# Patient Record
Sex: FEMALE | Race: WHITE | URBAN - METROPOLITAN AREA
[De-identification: names, ages, dates, MRNs, and addresses within clinical notes are randomized per-mention and may not be internally consistent; named-entity substitution may affect disease eponyms.]

---

## 2023-05-25 ENCOUNTER — HOSPITAL ENCOUNTER (OUTPATIENT)
Age: 43
Setting detail: SPECIMEN
Discharge: HOME | End: 2023-05-25

## 2023-05-25 ENCOUNTER — OFFICE VISIT (OUTPATIENT)
Dept: OBGYN CLINIC | Age: 43
End: 2023-05-25
Payer: COMMERCIAL

## 2023-05-25 VITALS
HEIGHT: 64 IN | WEIGHT: 190.8 LBS | BODY MASS INDEX: 32.58 KG/M2 | DIASTOLIC BLOOD PRESSURE: 80 MMHG | SYSTOLIC BLOOD PRESSURE: 114 MMHG

## 2023-05-25 DIAGNOSIS — Z20.2 POSSIBLE EXPOSURE TO STD: ICD-10-CM

## 2023-05-25 DIAGNOSIS — N89.8 VAGINAL ITCHING: Primary | ICD-10-CM

## 2023-05-25 DIAGNOSIS — N89.8 VAGINAL ITCHING: ICD-10-CM

## 2023-05-25 LAB
CANDIDA SPECIES, DNA PROBE: POSITIVE
GARDNERELLA VAGINALIS, DNA PROBE: NEGATIVE
SOURCE: ABNORMAL
TRICHOMONAS VAGINALIS DNA: NEGATIVE

## 2023-05-25 PROCEDURE — 99203 OFFICE O/P NEW LOW 30 MIN: CPT | Performed by: NURSE PRACTITIONER

## 2023-05-25 RX ORDER — HYDROXYZINE HYDROCHLORIDE 10 MG/1
10 TABLET, FILM COATED ORAL 3 TIMES DAILY PRN
COMMUNITY

## 2023-05-25 RX ORDER — TRAZODONE HYDROCHLORIDE 50 MG/1
50 TABLET ORAL NIGHTLY PRN
COMMUNITY
Start: 2023-03-03

## 2023-05-25 RX ORDER — METRONIDAZOLE 7.5 MG/G
1 GEL VAGINAL DAILY
Qty: 1 EACH | Refills: 0 | Status: SHIPPED | OUTPATIENT
Start: 2023-05-25 | End: 2023-05-30

## 2023-05-25 RX ORDER — FLUCONAZOLE 150 MG/1
150 TABLET ORAL ONCE
Qty: 1 TABLET | Refills: 0 | Status: SHIPPED | OUTPATIENT
Start: 2023-05-25 | End: 2023-05-25

## 2023-05-25 ASSESSMENT — ENCOUNTER SYMPTOMS
GASTROINTESTINAL NEGATIVE: 1
RESPIRATORY NEGATIVE: 1

## 2023-05-25 NOTE — PROGRESS NOTES
PROBLEM VISIT     Date of service: 2023    Joshua Velazquez  Is a 41 y.o. female    PT's PCP is: No primary care provider on file. : 1980                                             Subjective:       Patient's last menstrual period was 2023. OB History    Para Term  AB Living   4 4 4     4   SAB IAB Ectopic Molar Multiple Live Births             4      # Outcome Date GA Lbr Jose Rafael/2nd Weight Sex Delivery Anes PTL Lv   4 Term      Vag-Spont   LIZET   3 Term      Vag-Spont   LIZET   2 Term      Vag-Spont   LIZET   1 Term      Vag-Spont   LIZET        Social History     Tobacco Use   Smoking Status Never   Smokeless Tobacco Never        Social History     Substance and Sexual Activity   Alcohol Use Yes    Comment: occ       Allergies: Penicillins      Current Outpatient Medications:     hydrOXYzine HCl (ATARAX) 10 MG tablet, Take 1 tablet by mouth 3 times daily as needed for Itching, Disp: , Rfl:     metroNIDAZOLE (METROGEL) 0.75 % vaginal gel, Place 1 Applicatorful vaginally daily for 5 days, Disp: 1 each, Rfl: 0    fluconazole (DIFLUCAN) 150 MG tablet, Take 1 tablet by mouth once for 1 dose, Disp: 1 tablet, Rfl: 0    traZODone (DESYREL) 50 MG tablet, Take 1 tablet by mouth nightly as needed, Disp: , Rfl:     venlafaxine (EFFEXOR XR) 150 MG extended release capsule, , Disp: , Rfl:     Social History     Substance and Sexual Activity   Sexual Activity Yes    Partners: Male         Chief Complaint   Patient presents with    Vaginal Itching     C/O vaginal itching and swelling. Denies discharge. Going through divorce and has had new partners. PE:  Vital Signs  Blood pressure 114/80, height 5' 4\" (1.626 m), weight 190 lb 12.8 oz (86.5 kg), last menstrual period 2023. HPI: Patient present today with complaints of vaginal itching and swelling for the past 3-4 days. Denies any discharge. Admits to change in sexual partner would like std screening.      PT denies fever,

## 2023-05-26 LAB
C TRACH DNA SPEC QL PROBE+SIG AMP: NEGATIVE
N GONORRHOEA DNA SPEC QL PROBE+SIG AMP: NEGATIVE
SPECIMEN DESCRIPTION: NORMAL

## 2023-09-14 ENCOUNTER — HOSPITAL ENCOUNTER (OUTPATIENT)
Age: 43
Setting detail: SPECIMEN
Discharge: HOME | End: 2023-09-14

## 2023-09-14 ENCOUNTER — OFFICE VISIT (OUTPATIENT)
Dept: OBGYN CLINIC | Age: 43
End: 2023-09-14
Payer: COMMERCIAL

## 2023-09-14 VITALS
BODY MASS INDEX: 30.05 KG/M2 | HEIGHT: 64 IN | DIASTOLIC BLOOD PRESSURE: 80 MMHG | WEIGHT: 176 LBS | SYSTOLIC BLOOD PRESSURE: 115 MMHG

## 2023-09-14 DIAGNOSIS — D50.9 IRON DEFICIENCY ANEMIA, UNSPECIFIED IRON DEFICIENCY ANEMIA TYPE: ICD-10-CM

## 2023-09-14 DIAGNOSIS — Z01.419 WOMEN'S ANNUAL ROUTINE GYNECOLOGICAL EXAMINATION: Primary | ICD-10-CM

## 2023-09-14 DIAGNOSIS — R23.3 EASY BRUISING: ICD-10-CM

## 2023-09-14 DIAGNOSIS — N92.0 MENORRHAGIA WITH REGULAR CYCLE: ICD-10-CM

## 2023-09-14 DIAGNOSIS — R79.89 LOW SERUM VITAMIN D: ICD-10-CM

## 2023-09-14 LAB
BASOPHILS # BLD: <0.03 K/UL (ref 0–0.2)
BASOPHILS NFR BLD: 0 % (ref 0–2)
EOSINOPHIL # BLD: 0.08 K/UL (ref 0–0.44)
EOSINOPHILS RELATIVE PERCENT: 1 % (ref 1–4)
ERYTHROCYTE [DISTWIDTH] IN BLOOD BY AUTOMATED COUNT: 14 % (ref 11.8–14.4)
HCT VFR BLD AUTO: 36 % (ref 36.3–47.1)
HGB BLD-MCNC: 11.4 G/DL (ref 11.9–15.1)
IMM GRANULOCYTES # BLD AUTO: <0.03 K/UL (ref 0–0.3)
IMM GRANULOCYTES NFR BLD: 0 %
LYMPHOCYTES NFR BLD: 2.35 K/UL (ref 1.1–3.7)
LYMPHOCYTES RELATIVE PERCENT: 34 % (ref 24–43)
MCH RBC QN AUTO: 30.8 PG (ref 25.2–33.5)
MCHC RBC AUTO-ENTMCNC: 31.7 G/DL (ref 28.4–34.8)
MCV RBC AUTO: 97.3 FL (ref 82.6–102.9)
MONOCYTES NFR BLD: 0.34 K/UL (ref 0.1–1.2)
MONOCYTES NFR BLD: 5 % (ref 3–12)
NEUTROPHILS NFR BLD: 60 % (ref 36–65)
NEUTS SEG NFR BLD: 4.08 K/UL (ref 1.5–8.1)
NRBC BLD-RTO: 0 PER 100 WBC
PLATELET # BLD AUTO: 335 K/UL (ref 138–453)
PMV BLD AUTO: 10.2 FL (ref 8.1–13.5)
RBC # BLD AUTO: 3.7 M/UL (ref 3.95–5.11)
WBC OTHER # BLD: 6.9 K/UL (ref 3.5–11.3)

## 2023-09-14 PROCEDURE — 99396 PREV VISIT EST AGE 40-64: CPT | Performed by: NURSE PRACTITIONER

## 2023-09-14 NOTE — PROGRESS NOTES
Mental Status: She is alert and oriented to person, place, and time. Skin:     General: Skin is warm and dry. Psychiatric:         Mood and Affect: Mood normal.         Behavior: Behavior normal.                             Assessment & Plan  1. Women's annual routine gynecological examination  Repeat Annual every 1 year  Cervical Cytology Evaluation begins at 24years old. If Negative Cytology, Follow-up screening per current guidelines. Mammograms every 1year. If 37 yo and last mammogram was negative. Routine healthmaintenance per patients PCP. - PAP SMEAR; Future    2. Easy bruising  - CBC with Auto Differential; Future  - TSH With Reflex Ft4; Future  - Iron and TIBC; Future  - Ferritin; Future  - Vitamin D 25 Hydroxy; Future  - Vitamin B12; Future    3. Menorrhagia with regular cycle  Heavy menses- will proceed with usn and follow up. - CBC with Auto Differential; Future  - TSH With Reflex Ft4; Future    In addition to routine annual time was spent in regard to addressing multiple problems. Return in about 1 year (around 9/14/2024) for yearly.      Electronically Signed by LORENZO Cunningham NP

## 2023-09-15 LAB
25(OH)D3 SERPL-MCNC: 21.9 NG/ML
FERRITIN SERPL-MCNC: 17 NG/ML (ref 13–150)
IRON SATN MFR SERPL: 9 % (ref 20–55)
IRON SERPL-MCNC: 31 UG/DL (ref 37–145)
TIBC SERPL-MCNC: 362 UG/DL (ref 250–450)
TSH SERPL DL<=0.05 MIU/L-ACNC: 1.01 UIU/ML (ref 0.3–5)
UNSATURATED IRON BINDING CAPACITY: 331 UG/DL (ref 112–347)
VIT B12 SERPL-MCNC: 565 PG/ML (ref 232–1245)

## 2023-09-19 LAB
HPV I/H RISK 4 DNA CVX QL NAA+PROBE: NOT DETECTED
HPV SAMPLE: NORMAL
HPV, INTERPRETATION: NORMAL
HPV16 DNA CVX QL NAA+PROBE: NOT DETECTED
HPV18 DNA CVX QL NAA+PROBE: NOT DETECTED
SPECIMEN DESCRIPTION: NORMAL

## 2023-09-20 RX ORDER — FERROUS SULFATE 325(65) MG
325 TABLET ORAL 2 TIMES DAILY
Qty: 60 TABLET | Refills: 5 | Status: SHIPPED | OUTPATIENT
Start: 2023-09-20

## 2023-09-20 RX ORDER — ERGOCALCIFEROL 1.25 MG/1
50000 CAPSULE ORAL WEEKLY
Qty: 12 CAPSULE | Refills: 1 | Status: SHIPPED | OUTPATIENT
Start: 2023-09-20

## 2023-09-20 ASSESSMENT — ENCOUNTER SYMPTOMS
SHORTNESS OF BREATH: 0
CONSTIPATION: 0
DIARRHEA: 0
ABDOMINAL PAIN: 0

## 2023-09-23 LAB — CYTOLOGY REPORT: NORMAL

## 2023-09-25 RX ORDER — METRONIDAZOLE 500 MG/1
500 TABLET ORAL 2 TIMES DAILY
Qty: 14 TABLET | Refills: 0 | Status: SHIPPED | OUTPATIENT
Start: 2023-09-25 | End: 2023-10-02

## 2023-10-09 ENCOUNTER — OFFICE VISIT (OUTPATIENT)
Dept: OBGYN CLINIC | Age: 43
End: 2023-10-09
Payer: COMMERCIAL

## 2023-10-09 ENCOUNTER — TELEPHONE (OUTPATIENT)
Dept: OBGYN CLINIC | Age: 43
End: 2023-10-09

## 2023-10-09 VITALS
SYSTOLIC BLOOD PRESSURE: 108 MMHG | DIASTOLIC BLOOD PRESSURE: 65 MMHG | BODY MASS INDEX: 29.89 KG/M2 | WEIGHT: 175.1 LBS | HEIGHT: 64 IN

## 2023-10-09 DIAGNOSIS — D25.1 INTRAMURAL UTERINE FIBROID: ICD-10-CM

## 2023-10-09 DIAGNOSIS — N92.0 MENORRHAGIA WITH REGULAR CYCLE: Primary | ICD-10-CM

## 2023-10-09 PROCEDURE — 99213 OFFICE O/P EST LOW 20 MIN: CPT | Performed by: NURSE PRACTITIONER

## 2023-10-09 RX ORDER — METRONIDAZOLE 500 MG/1
TABLET ORAL
COMMUNITY
Start: 2023-10-04

## 2023-10-09 ASSESSMENT — ENCOUNTER SYMPTOMS: RESPIRATORY NEGATIVE: 1

## 2023-10-09 NOTE — PROGRESS NOTES
admitted to hospital. Plans to  and start today. I am having Merridavin Post. Rober Paulino maintain her venlafaxine, traZODone, hydrOXYzine HCl, vitamin D, ferrous sulfate, and metroNIDAZOLE. No follow-ups on file. There are no Patient Instructions on file for this visit.     Time spent 20 minutes      LORENZO Meyer NP,10/9/2023 8:58 PM

## 2023-10-10 ENCOUNTER — TELEPHONE (OUTPATIENT)
Dept: OBGYN CLINIC | Age: 43
End: 2023-10-10

## 2023-10-10 NOTE — TELEPHONE ENCOUNTER
Patient left a message on the voicemail stating that she has been in pain since having her ultrasound yesterday. She has been using ibuprofen and it is not helping. Not sure if the ultrasound aggravated her uterus, but wants to know if there are further recommendations to help with the pain? Can you please review and give recommendations?

## 2023-10-10 NOTE — TELEPHONE ENCOUNTER
Spoke to patient, the pain is in her left abdomen and will have shooting pain down her leg. I reviewed Chas's recommendations. Patient agreeable to the plan and will call with any further questions/concerns.

## 2023-10-10 NOTE — TELEPHONE ENCOUNTER
Spoke to patient and reviewed surgery expectations and recovery. She is scheduled for a RA TLH, poss BSO, poss Lap Colpo at [facility] on 2/19/2024. She is aware that a nurse from [facility] will call her to complete a phone interview and arrange COVID testing if needed. She is a  and will let me know if she is needing a note for work. Patient will come in to see Dr. Kaiden Mace prior to surgery, no pre-op testing needed. We will also follow up 2 and 6 weeks after surgery. I am penciling patient in for first available opening, but will wait a few weeks to see if able to move surgery up before scheduling any other visits. Patient verbalized understanding, no further questions/concerns voiced.

## 2023-10-25 ENCOUNTER — TELEPHONE (OUTPATIENT)
Dept: OBGYN CLINIC | Age: 43
End: 2023-10-25

## 2023-10-25 NOTE — TELEPHONE ENCOUNTER
I called patient to offer her a sooner c/s on 1/8 as opposed to the 2/19 surgery date. We are moving up her surgery. She states that she was actually calling the office when I called her. She started her cycle yesterday and starting today, it is very heavy. She is passing clots up to quarter size and cramping. She is changing a pad/tampon about every 1.5- 2 hours. Medication was discussed at her previous visit, but she declined at that time due to not wanting to be on birth control. She feels like she does need something to help control the bleeding to avoid worsening anemia. Can you please review and give recommendations?

## 2023-10-25 NOTE — TELEPHONE ENCOUNTER
Spoke to patient and reviewed options given by Fransisco Lyn. Patient denies any history of clots. Patient would like to proceed with Aygestin. Per Fransisco Lyn, patient to take BID throughout her cycle. Rx e-prescribed to [facility] in [facility]. She is scheduled for a RA TLH, poss BSO, poss Lap Colpo at [facility] on 1/8/2024. She is aware that a nurse from [facility] will call her to complete a phone interview and arrange COVID testing if needed. She will let me know if she is needing a note for work. Patient will come in to see Dr. Varsha Goldsmith prior to surgery, no pre-op testing needed. We will also follow up 2 and 6 weeks after surgery. She is currently at work and unable to schedule her visits, so will call back. Patient verbalized understanding, no further questions/concerns voiced.

## 2023-11-02 ENCOUNTER — TELEPHONE (OUTPATIENT)
Dept: OBGYN CLINIC | Age: 43
End: 2023-11-02

## 2023-11-02 NOTE — TELEPHONE ENCOUNTER
I called patient and relayed information to her. She said the only reason she didn't want hormones is because when she was on ocp in the past it made her maldonado and caused weight gain. At this point she \"just wants bleeding to stop\". She is open to increasing aygestin dose. I encouraged her to continue while bleeding, don't stop and start.   She uses [facility] [place]

## 2023-11-02 NOTE — TELEPHONE ENCOUNTER
I attempted to call patient and had to leave a message. Reviewed Chas's recommendations and the medication was sen tot  pharmacy. Patient will call back if any issues with the medication. I also reviewed surgery detailed for her new surgery date of 1/8/24. She is instructed to call back to schedule her pre-op and post-op visits. Patient to call with any further questions/concerns.

## 2023-11-02 NOTE — TELEPHONE ENCOUNTER
Patient called. She reports that she currently on day 10 of her cycle. She has been heavy bleeding since last Saturday. Bleeding will taper off for 4-5 hours and then return with a gush and be heavy for 4-5 hours, changing super plus tampon every 2-3 hours. She has never had a period last this long. She is also having cramping. She was given Aygestin to take bid with menses. She did that but has not taken the last 2 days because she thought that the Aygestin may be the reason that her bleeding was lasting longer than her normal cycle. She thinks the aygestin helped at first but was then no longer helping. Do you have any recommendations?   Surgery is in Jan.

## 2023-11-15 ENCOUNTER — TELEPHONE (OUTPATIENT)
Dept: OBGYN CLINIC | Age: 43
End: 2023-11-15

## 2023-11-15 NOTE — TELEPHONE ENCOUNTER
Pt called requesting a refill of Aygestin until her surgery on 1/3/24.  Pt also c/o hair loss and was wondering if it could be related to taking iron and Vitamin D.

## 2023-11-16 NOTE — TELEPHONE ENCOUNTER
Spoke with patient about bleeding patterns. Pt stated last week she bleed for 2 weeks but did miss a dose of Aygestin. Pt states when she has a cycle she needs to change her pad every 1-2 hours during the entire cycle. Pt has 2 days left of her current Aygestin script. Pt is very afraid of what will happened when she goes off the Aygestin. Pt states she can't be leaving her post at work every 1-2 hours to change her pad. Pt would like something to make her bleeding less until she can have surgery.

## 2023-11-16 NOTE — TELEPHONE ENCOUNTER
Spoke to patient, she states that she missed one dose of the Aygestin and started having some red spotting that lasted a day. Otherwise, she is not having any bleeding. She is concerned about running out of the Aygestin and having several weeks before surgery. She also states that she is experiencing a lot of hair loss and questions if from anemia/vitamin D deficiency. Reassured patient that her body has a lot going on right now, and the Aygestin, vitamin D supplement, and iron pills should help with her symptoms. Johnnie Bragg will refill her Aygestin that was originally prescribed to last until surgery. Patient verbalized understanding, no further questions/concerns voiced.

## 2023-12-04 ENCOUNTER — HOSPITAL ENCOUNTER (OUTPATIENT)
Age: 43
Setting detail: SPECIMEN
Discharge: HOME | End: 2023-12-04

## 2023-12-04 ENCOUNTER — OFFICE VISIT (OUTPATIENT)
Dept: OBGYN CLINIC | Age: 43
End: 2023-12-04
Payer: COMMERCIAL

## 2023-12-04 VITALS — SYSTOLIC BLOOD PRESSURE: 110 MMHG | DIASTOLIC BLOOD PRESSURE: 74 MMHG | BODY MASS INDEX: 29.76 KG/M2 | WEIGHT: 173.4 LBS

## 2023-12-04 DIAGNOSIS — R82.90 FOUL SMELLING URINE: ICD-10-CM

## 2023-12-04 DIAGNOSIS — R30.0 DYSURIA: ICD-10-CM

## 2023-12-04 DIAGNOSIS — R30.0 DYSURIA: Primary | ICD-10-CM

## 2023-12-04 LAB
BILIRUBIN, POC: ABNORMAL
BLOOD URINE, POC: ABNORMAL
CLARITY, POC: CLEAR
COLOR, POC: YELLOW
GLUCOSE URINE, POC: ABNORMAL
KETONES, POC: ABNORMAL
LEUKOCYTE EST, POC: ABNORMAL
NITRITE, POC: ABNORMAL
PH, POC: 5.5
PROTEIN, POC: ABNORMAL
SPECIFIC GRAVITY, POC: 1.01
UROBILINOGEN, POC: ABNORMAL

## 2023-12-04 PROCEDURE — 99214 OFFICE O/P EST MOD 30 MIN: CPT | Performed by: NURSE PRACTITIONER

## 2023-12-04 PROCEDURE — 81002 URINALYSIS NONAUTO W/O SCOPE: CPT | Performed by: NURSE PRACTITIONER

## 2023-12-04 RX ORDER — NITROFURANTOIN 25; 75 MG/1; MG/1
100 CAPSULE ORAL 2 TIMES DAILY
Qty: 10 CAPSULE | Refills: 0 | Status: SHIPPED | OUTPATIENT
Start: 2023-12-04 | End: 2023-12-09

## 2023-12-04 ASSESSMENT — ENCOUNTER SYMPTOMS: RESPIRATORY NEGATIVE: 1

## 2023-12-04 NOTE — PROGRESS NOTES
Signs  Blood pressure 110/74, weight 78.7 kg (173 lb 6.4 oz), last menstrual period 12/04/2023. HPI: Patient presents today dysuria and foul smelling urine x2 weeks. States attempted pushing fluids without relief. Has has new sexual partner but declines std screening today. Started spotting today. PT denies fever, chills, nausea and vomiting       Review of Systems   Constitutional: Negative. Respiratory: Negative. Cardiovascular: Negative. Genitourinary:  Positive for dysuria. Negative for difficulty urinating, frequency, pelvic pain, vaginal bleeding, vaginal discharge and vaginal pain. Foul smelling urine       Physical Exam  Constitutional:       Appearance: Normal appearance. HENT:      Head: Normocephalic. Pulmonary:      Effort: Pulmonary effort is normal.   Abdominal:      Tenderness: There is no right CVA tenderness or left CVA tenderness. Musculoskeletal:         General: Normal range of motion. Neurological:      General: No focal deficit present. Mental Status: She is alert. Psychiatric:         Mood and Affect: Mood normal.         Behavior: Behavior normal.         Assessment and Plan          Diagnosis Orders   1. Dysuria  POCT Urinalysis Dipstick (No Micro)    Culture, Urine    nitrofurantoin, macrocrystal-monohydrate, (MACROBID) 100 MG capsule      2. Foul smelling urine                  I am having Jason Ray start on nitrofurantoin (macrocrystal-monohydrate). I am also having her maintain her venlafaxine, traZODone, hydrOXYzine HCl, vitamin D, ferrous sulfate, metroNIDAZOLE, and norethindrone. Return if symptoms worsen or fail to improve. There are no Patient Instructions on file for this visit.     LORENZO Ascencio NP

## 2023-12-06 LAB
MICROORGANISM SPEC CULT: ABNORMAL
SPECIMEN DESCRIPTION: ABNORMAL

## 2024-01-02 SDOH — ECONOMIC STABILITY: FOOD INSECURITY: WITHIN THE PAST 12 MONTHS, THE FOOD YOU BOUGHT JUST DIDN'T LAST AND YOU DIDN'T HAVE MONEY TO GET MORE.: NEVER TRUE

## 2024-01-02 SDOH — ECONOMIC STABILITY: HOUSING INSECURITY
IN THE LAST 12 MONTHS, WAS THERE A TIME WHEN YOU DID NOT HAVE A STEADY PLACE TO SLEEP OR SLEPT IN A SHELTER (INCLUDING NOW)?: NO

## 2024-01-02 SDOH — ECONOMIC STABILITY: INCOME INSECURITY: HOW HARD IS IT FOR YOU TO PAY FOR THE VERY BASICS LIKE FOOD, HOUSING, MEDICAL CARE, AND HEATING?: SOMEWHAT HARD

## 2024-01-02 SDOH — ECONOMIC STABILITY: TRANSPORTATION INSECURITY
IN THE PAST 12 MONTHS, HAS LACK OF TRANSPORTATION KEPT YOU FROM MEETINGS, WORK, OR FROM GETTING THINGS NEEDED FOR DAILY LIVING?: NO

## 2024-01-02 SDOH — ECONOMIC STABILITY: FOOD INSECURITY: WITHIN THE PAST 12 MONTHS, YOU WORRIED THAT YOUR FOOD WOULD RUN OUT BEFORE YOU GOT MONEY TO BUY MORE.: SOMETIMES TRUE

## 2024-01-02 ASSESSMENT — PATIENT HEALTH QUESTIONNAIRE - PHQ9
SUM OF ALL RESPONSES TO PHQ9 QUESTIONS 1 & 2: 0
2. FEELING DOWN, DEPRESSED OR HOPELESS: 0
SUM OF ALL RESPONSES TO PHQ QUESTIONS 1-9: 0
1. LITTLE INTEREST OR PLEASURE IN DOING THINGS: NOT AT ALL
SUM OF ALL RESPONSES TO PHQ QUESTIONS 1-9: 0
2. FEELING DOWN, DEPRESSED OR HOPELESS: NOT AT ALL
1. LITTLE INTEREST OR PLEASURE IN DOING THINGS: 0
SUM OF ALL RESPONSES TO PHQ9 QUESTIONS 1 & 2: 0

## 2024-01-03 ENCOUNTER — OFFICE VISIT (OUTPATIENT)
Dept: OBGYN CLINIC | Age: 44
End: 2024-01-03
Payer: COMMERCIAL

## 2024-01-03 VITALS — DIASTOLIC BLOOD PRESSURE: 78 MMHG | WEIGHT: 169 LBS | SYSTOLIC BLOOD PRESSURE: 118 MMHG | BODY MASS INDEX: 29.01 KG/M2

## 2024-01-03 DIAGNOSIS — N92.0 MENORRHAGIA WITH REGULAR CYCLE: Primary | ICD-10-CM

## 2024-01-03 DIAGNOSIS — D25.1 INTRAMURAL UTERINE FIBROID: ICD-10-CM

## 2024-01-03 PROCEDURE — 99213 OFFICE O/P EST LOW 20 MIN: CPT | Performed by: OBSTETRICS & GYNECOLOGY

## 2024-01-03 NOTE — H&P (VIEW-ONLY)
DATE OF VISIT:  1/3/24    PATIENT NAME:  Tabatha Ray     YOB: 1980    REASON FOR VISIT:    Chief Complaint   Patient presents with    Pre-op Exam     Patient is scheduled on 1-8-2024 for RA TLH, poss BSO, poss lap colpo.     Pelvic Pain     LLQ tenderness. Pain comes and goes.     Menorrhagia     Patent currently on Aygestin to suppress bleeding and is currently spotting. She has been bleeding non-stop for the past 2-3 months.  Prior to aygestin she was changing a super tampon every hour.          HISTORY OF PRESENT ILLNESS:  Patient complains of heavy/saturating menses lasting 7-10; needing to change super tampon hourly at times for the past 3 months. Has associated clotting. USN showed Uterus measures: 10.0 x 8.0  x 4.9 cm; Thickened endometrium measures: 9.6 mm; Intramural fibroid left uterus kash: 5.5 x 4.9 x 4.6 cm; Right ovary appears WNL; Left ovary appears WNL; pt interested in definitive treatment; disc'd ra tlh/tiana bso; r/b/a reviewed; restrictions and recovery disc'd          Patient's last menstrual period was 12/04/2023.  Vitals:    01/03/24 1516   BP: 118/78   Site: Left Upper Arm   Position: Sitting   Weight: 76.7 kg (169 lb)     Body mass index is 29.01 kg/m².  Allergies   Allergen Reactions    Penicillins      Current Outpatient Medications   Medication Sig Dispense Refill    norethindrone (AYGESTIN) 5 MG tablet Take 2 tablets by mouth daily (Patient taking differently: Take 1 tablet by mouth in the morning and 1 tablet in the evening.) 60 tablet 1    vitamin D (ERGOCALCIFEROL) 1.25 MG (86318 UT) CAPS capsule Take 1 capsule by mouth once a week 12 capsule 1     No current facility-administered medications for this visit.     Social History     Socioeconomic History    Marital status:    Tobacco Use    Smoking status: Never    Smokeless tobacco: Never   Vaping Use    Vaping Use: Never used   Substance and Sexual Activity    Alcohol use: Not Currently     Comment: occ

## 2024-01-03 NOTE — PROGRESS NOTES
DATE OF VISIT:  1/3/24    PATIENT NAME:  Tabatha Ray     YOB: 1980    REASON FOR VISIT:    Chief Complaint   Patient presents with    Pre-op Exam     Patient is scheduled on 1-8-2024 for RA TLH, poss BSO, poss lap colpo.     Pelvic Pain     LLQ tenderness. Pain comes and goes.     Menorrhagia     Patent currently on Aygestin to suppress bleeding and is currently spotting. She has been bleeding non-stop for the past 2-3 months.  Prior to aygestin she was changing a super tampon every hour.          HISTORY OF PRESENT ILLNESS:  Patient complains of heavy/saturating menses lasting 7-10; needing to change super tampon hourly at times for the past 3 months. Has associated clotting. USN showed Uterus measures: 10.0 x 8.0  x 4.9 cm; Thickened endometrium measures: 9.6 mm; Intramural fibroid left uterus kash: 5.5 x 4.9 x 4.6 cm; Right ovary appears WNL; Left ovary appears WNL; pt interested in definitive treatment; disc'd ra tlh/tiana bso; r/b/a reviewed; restrictions and recovery disc'd          Patient's last menstrual period was 12/04/2023.  Vitals:    01/03/24 1516   BP: 118/78   Site: Left Upper Arm   Position: Sitting   Weight: 76.7 kg (169 lb)     Body mass index is 29.01 kg/m².  Allergies   Allergen Reactions    Penicillins      Current Outpatient Medications   Medication Sig Dispense Refill    norethindrone (AYGESTIN) 5 MG tablet Take 2 tablets by mouth daily (Patient taking differently: Take 1 tablet by mouth in the morning and 1 tablet in the evening.) 60 tablet 1    vitamin D (ERGOCALCIFEROL) 1.25 MG (20997 UT) CAPS capsule Take 1 capsule by mouth once a week 12 capsule 1     No current facility-administered medications for this visit.     Social History     Socioeconomic History    Marital status:    Tobacco Use    Smoking status: Never    Smokeless tobacco: Never   Vaping Use    Vaping Use: Never used   Substance and Sexual Activity    Alcohol use: Not Currently     Comment: occ

## 2024-01-08 ENCOUNTER — ANESTHESIA (OUTPATIENT)
Dept: OPERATING ROOM | Age: 44
End: 2024-01-08
Payer: COMMERCIAL

## 2024-01-08 ENCOUNTER — HOSPITAL ENCOUNTER (OUTPATIENT)
Age: 44
Setting detail: OUTPATIENT SURGERY
Discharge: HOME | End: 2024-01-08
Attending: OBSTETRICS & GYNECOLOGY | Admitting: OBSTETRICS & GYNECOLOGY
Payer: COMMERCIAL

## 2024-01-08 ENCOUNTER — ANESTHESIA EVENT (OUTPATIENT)
Dept: OPERATING ROOM | Age: 44
End: 2024-01-08
Payer: COMMERCIAL

## 2024-01-08 VITALS
WEIGHT: 164 LBS | TEMPERATURE: 97 F | HEART RATE: 84 BPM | DIASTOLIC BLOOD PRESSURE: 75 MMHG | SYSTOLIC BLOOD PRESSURE: 113 MMHG | RESPIRATION RATE: 16 BRPM | BODY MASS INDEX: 28.15 KG/M2 | OXYGEN SATURATION: 99 %

## 2024-01-08 DIAGNOSIS — N92.0 MENORRHAGIA WITH REGULAR CYCLE: ICD-10-CM

## 2024-01-08 DIAGNOSIS — G89.18 POSTOPERATIVE PAIN: Primary | ICD-10-CM

## 2024-01-08 PROCEDURE — 7100000000 HC PACU RECOVERY - FIRST 15 MIN: Performed by: OBSTETRICS & GYNECOLOGY

## 2024-01-08 PROCEDURE — 6360000002 HC RX W HCPCS: Performed by: NURSE ANESTHETIST, CERTIFIED REGISTERED

## 2024-01-08 PROCEDURE — 6360000002 HC RX W HCPCS

## 2024-01-08 PROCEDURE — 3700000001 HC ADD 15 MINUTES (ANESTHESIA): Performed by: OBSTETRICS & GYNECOLOGY

## 2024-01-08 PROCEDURE — 2580000003 HC RX 258

## 2024-01-08 PROCEDURE — 6370000000 HC RX 637 (ALT 250 FOR IP): Performed by: NURSE ANESTHETIST, CERTIFIED REGISTERED

## 2024-01-08 PROCEDURE — 7100000010 HC PHASE II RECOVERY - FIRST 15 MIN: Performed by: OBSTETRICS & GYNECOLOGY

## 2024-01-08 PROCEDURE — 2709999900 HC NON-CHARGEABLE SUPPLY: Performed by: OBSTETRICS & GYNECOLOGY

## 2024-01-08 PROCEDURE — 7100000001 HC PACU RECOVERY - ADDTL 15 MIN: Performed by: OBSTETRICS & GYNECOLOGY

## 2024-01-08 PROCEDURE — 88307 TISSUE EXAM BY PATHOLOGIST: CPT

## 2024-01-08 PROCEDURE — 64488 TAP BLOCK BI INJECTION: CPT | Performed by: NURSE ANESTHETIST, CERTIFIED REGISTERED

## 2024-01-08 PROCEDURE — A4216 STERILE WATER/SALINE, 10 ML: HCPCS

## 2024-01-08 PROCEDURE — 2500000003 HC RX 250 WO HCPCS: Performed by: NURSE ANESTHETIST, CERTIFIED REGISTERED

## 2024-01-08 PROCEDURE — 3600000019 HC SURGERY ROBOT ADDTL 15MIN: Performed by: OBSTETRICS & GYNECOLOGY

## 2024-01-08 PROCEDURE — S2900 ROBOTIC SURGICAL SYSTEM: HCPCS | Performed by: OBSTETRICS & GYNECOLOGY

## 2024-01-08 PROCEDURE — 3700000000 HC ANESTHESIA ATTENDED CARE: Performed by: OBSTETRICS & GYNECOLOGY

## 2024-01-08 PROCEDURE — 3600000009 HC SURGERY ROBOT BASE: Performed by: OBSTETRICS & GYNECOLOGY

## 2024-01-08 PROCEDURE — 7100000011 HC PHASE II RECOVERY - ADDTL 15 MIN: Performed by: OBSTETRICS & GYNECOLOGY

## 2024-01-08 RX ORDER — DEXAMETHASONE SODIUM PHOSPHATE 10 MG/ML
INJECTION, SOLUTION INTRAMUSCULAR; INTRAVENOUS PRN
Status: DISCONTINUED | OUTPATIENT
Start: 2024-01-08 | End: 2024-01-08 | Stop reason: SDUPTHER

## 2024-01-08 RX ORDER — DEXAMETHASONE SODIUM PHOSPHATE 4 MG/ML
INJECTION, SOLUTION INTRA-ARTICULAR; INTRALESIONAL; INTRAMUSCULAR; INTRAVENOUS; SOFT TISSUE PRN
Status: DISCONTINUED | OUTPATIENT
Start: 2024-01-08 | End: 2024-01-08 | Stop reason: SDUPTHER

## 2024-01-08 RX ORDER — HYDROCODONE BITARTRATE AND ACETAMINOPHEN 5; 325 MG/1; MG/1
1 TABLET ORAL EVERY 6 HOURS PRN
Qty: 10 TABLET | Refills: 0 | Status: SHIPPED | OUTPATIENT
Start: 2024-01-08 | End: 2024-01-13

## 2024-01-08 RX ORDER — KETOROLAC TROMETHAMINE 10 MG/1
10 TABLET, FILM COATED ORAL EVERY 6 HOURS PRN
Qty: 20 TABLET | Refills: 0 | Status: SHIPPED | OUTPATIENT
Start: 2024-01-08 | End: 2025-01-07

## 2024-01-08 RX ORDER — LEVOFLOXACIN 5 MG/ML
500 INJECTION, SOLUTION INTRAVENOUS
Status: COMPLETED | OUTPATIENT
Start: 2024-01-08 | End: 2024-01-08

## 2024-01-08 RX ORDER — FENTANYL CITRATE 50 UG/ML
INJECTION, SOLUTION INTRAMUSCULAR; INTRAVENOUS PRN
Status: DISCONTINUED | OUTPATIENT
Start: 2024-01-08 | End: 2024-01-08 | Stop reason: SDUPTHER

## 2024-01-08 RX ORDER — KETOROLAC TROMETHAMINE 30 MG/ML
INJECTION, SOLUTION INTRAMUSCULAR; INTRAVENOUS PRN
Status: DISCONTINUED | OUTPATIENT
Start: 2024-01-08 | End: 2024-01-08 | Stop reason: SDUPTHER

## 2024-01-08 RX ORDER — SODIUM CHLORIDE 9 MG/ML
INJECTION INTRAVENOUS PRN
Status: DISCONTINUED | OUTPATIENT
Start: 2024-01-08 | End: 2024-01-08 | Stop reason: SDUPTHER

## 2024-01-08 RX ORDER — GABAPENTIN 300 MG/1
300 CAPSULE ORAL ONCE
Status: COMPLETED | OUTPATIENT
Start: 2024-01-08 | End: 2024-01-08

## 2024-01-08 RX ORDER — SODIUM CHLORIDE, SODIUM LACTATE, POTASSIUM CHLORIDE, CALCIUM CHLORIDE 600; 310; 30; 20 MG/100ML; MG/100ML; MG/100ML; MG/100ML
INJECTION, SOLUTION INTRAVENOUS CONTINUOUS
Status: DISCONTINUED | OUTPATIENT
Start: 2024-01-08 | End: 2024-01-08 | Stop reason: HOSPADM

## 2024-01-08 RX ORDER — LIDOCAINE HYDROCHLORIDE 20 MG/ML
INJECTION, SOLUTION EPIDURAL; INFILTRATION; INTRACAUDAL; PERINEURAL PRN
Status: DISCONTINUED | OUTPATIENT
Start: 2024-01-08 | End: 2024-01-08 | Stop reason: SDUPTHER

## 2024-01-08 RX ORDER — FENTANYL CITRATE 50 UG/ML
50 INJECTION, SOLUTION INTRAMUSCULAR; INTRAVENOUS EVERY 5 MIN PRN
Status: DISCONTINUED | OUTPATIENT
Start: 2024-01-08 | End: 2024-01-08 | Stop reason: HOSPADM

## 2024-01-08 RX ORDER — FENTANYL CITRATE 50 UG/ML
25 INJECTION, SOLUTION INTRAMUSCULAR; INTRAVENOUS EVERY 5 MIN PRN
Status: DISCONTINUED | OUTPATIENT
Start: 2024-01-08 | End: 2024-01-08 | Stop reason: HOSPADM

## 2024-01-08 RX ORDER — ONDANSETRON 2 MG/ML
INJECTION INTRAMUSCULAR; INTRAVENOUS PRN
Status: DISCONTINUED | OUTPATIENT
Start: 2024-01-08 | End: 2024-01-08 | Stop reason: SDUPTHER

## 2024-01-08 RX ORDER — OXYCODONE HYDROCHLORIDE 5 MG/1
10 TABLET ORAL PRN
Status: COMPLETED | OUTPATIENT
Start: 2024-01-08 | End: 2024-01-08

## 2024-01-08 RX ORDER — PHENAZOPYRIDINE HYDROCHLORIDE 100 MG/1
200 TABLET, FILM COATED ORAL
Status: COMPLETED | OUTPATIENT
Start: 2024-01-08 | End: 2024-01-08

## 2024-01-08 RX ORDER — ENOXAPARIN SODIUM 100 MG/ML
40 INJECTION SUBCUTANEOUS ONCE
Status: COMPLETED | OUTPATIENT
Start: 2024-01-08 | End: 2024-01-08

## 2024-01-08 RX ORDER — ACETAMINOPHEN 325 MG/1
650 TABLET ORAL ONCE
Status: COMPLETED | OUTPATIENT
Start: 2024-01-08 | End: 2024-01-08

## 2024-01-08 RX ORDER — PROCHLORPERAZINE EDISYLATE 5 MG/ML
5 INJECTION INTRAMUSCULAR; INTRAVENOUS
Status: DISCONTINUED | OUTPATIENT
Start: 2024-01-08 | End: 2024-01-08 | Stop reason: HOSPADM

## 2024-01-08 RX ORDER — SODIUM CHLORIDE 0.9 % (FLUSH) 0.9 %
5-40 SYRINGE (ML) INJECTION PRN
Status: DISCONTINUED | OUTPATIENT
Start: 2024-01-08 | End: 2024-01-08 | Stop reason: HOSPADM

## 2024-01-08 RX ORDER — OXYCODONE HYDROCHLORIDE 5 MG/1
5 TABLET ORAL PRN
Status: COMPLETED | OUTPATIENT
Start: 2024-01-08 | End: 2024-01-08

## 2024-01-08 RX ORDER — PROPOFOL 10 MG/ML
INJECTION, EMULSION INTRAVENOUS PRN
Status: DISCONTINUED | OUTPATIENT
Start: 2024-01-08 | End: 2024-01-08 | Stop reason: SDUPTHER

## 2024-01-08 RX ORDER — SODIUM CHLORIDE 0.9 % (FLUSH) 0.9 %
5-40 SYRINGE (ML) INJECTION EVERY 12 HOURS SCHEDULED
Status: DISCONTINUED | OUTPATIENT
Start: 2024-01-08 | End: 2024-01-08 | Stop reason: HOSPADM

## 2024-01-08 RX ORDER — DIMENHYDRINATE 50 MG
50 TABLET ORAL ONCE
Status: COMPLETED | OUTPATIENT
Start: 2024-01-08 | End: 2024-01-08

## 2024-01-08 RX ORDER — METRONIDAZOLE 500 MG/100ML
500 INJECTION, SOLUTION INTRAVENOUS
Status: COMPLETED | OUTPATIENT
Start: 2024-01-08 | End: 2024-01-08

## 2024-01-08 RX ORDER — ROCURONIUM BROMIDE 10 MG/ML
INJECTION, SOLUTION INTRAVENOUS PRN
Status: DISCONTINUED | OUTPATIENT
Start: 2024-01-08 | End: 2024-01-08 | Stop reason: SDUPTHER

## 2024-01-08 RX ORDER — ROPIVACAINE HYDROCHLORIDE 5 MG/ML
INJECTION, SOLUTION EPIDURAL; INFILTRATION; PERINEURAL PRN
Status: DISCONTINUED | OUTPATIENT
Start: 2024-01-08 | End: 2024-01-08 | Stop reason: SDUPTHER

## 2024-01-08 RX ORDER — MIDAZOLAM HYDROCHLORIDE 1 MG/ML
INJECTION INTRAMUSCULAR; INTRAVENOUS PRN
Status: DISCONTINUED | OUTPATIENT
Start: 2024-01-08 | End: 2024-01-08 | Stop reason: SDUPTHER

## 2024-01-08 RX ORDER — SODIUM CHLORIDE 9 MG/ML
INJECTION, SOLUTION INTRAVENOUS PRN
Status: DISCONTINUED | OUTPATIENT
Start: 2024-01-08 | End: 2024-01-08 | Stop reason: HOSPADM

## 2024-01-08 RX ORDER — ONDANSETRON 2 MG/ML
4 INJECTION INTRAMUSCULAR; INTRAVENOUS
Status: DISCONTINUED | OUTPATIENT
Start: 2024-01-08 | End: 2024-01-08 | Stop reason: HOSPADM

## 2024-01-08 RX ADMIN — LEVOFLOXACIN 500 MG: 500 INJECTION, SOLUTION INTRAVENOUS at 14:04

## 2024-01-08 RX ADMIN — MIDAZOLAM 2 MG: 1 INJECTION INTRAMUSCULAR; INTRAVENOUS at 13:00

## 2024-01-08 RX ADMIN — KETOROLAC TROMETHAMINE 30 MG: 30 INJECTION, SOLUTION INTRAMUSCULAR; INTRAVENOUS at 15:18

## 2024-01-08 RX ADMIN — OXYCODONE 5 MG: 5 TABLET ORAL at 16:33

## 2024-01-08 RX ADMIN — DEXAMETHASONE SODIUM PHOSPHATE 10 MG: 10 INJECTION, SOLUTION INTRAMUSCULAR; INTRAVENOUS at 13:08

## 2024-01-08 RX ADMIN — SODIUM CHLORIDE 40 ML: 9 INJECTION, SOLUTION INTRAMUSCULAR; INTRAVENOUS; SUBCUTANEOUS at 13:08

## 2024-01-08 RX ADMIN — FENTANYL CITRATE 50 MCG: 50 INJECTION INTRAMUSCULAR; INTRAVENOUS at 13:00

## 2024-01-08 RX ADMIN — DEXAMETHASONE SODIUM PHOSPHATE 4 MG: 4 INJECTION INTRA-ARTICULAR; INTRALESIONAL; INTRAMUSCULAR; INTRAVENOUS; SOFT TISSUE at 14:43

## 2024-01-08 RX ADMIN — FENTANYL CITRATE 25 MCG: 50 INJECTION INTRAMUSCULAR; INTRAVENOUS at 14:14

## 2024-01-08 RX ADMIN — ENOXAPARIN SODIUM 40 MG: 100 INJECTION SUBCUTANEOUS at 12:34

## 2024-01-08 RX ADMIN — PROPOFOL 160 MG: 10 INJECTION, EMULSION INTRAVENOUS at 14:14

## 2024-01-08 RX ADMIN — ACETAMINOPHEN 650 MG: 325 TABLET ORAL at 12:34

## 2024-01-08 RX ADMIN — FENTANYL CITRATE 50 MCG: 50 INJECTION, SOLUTION INTRAMUSCULAR; INTRAVENOUS at 16:06

## 2024-01-08 RX ADMIN — DIMENHYDRINATE 50 MG: 50 TABLET ORAL at 12:34

## 2024-01-08 RX ADMIN — FENTANYL CITRATE 25 MCG: 50 INJECTION INTRAMUSCULAR; INTRAVENOUS at 15:03

## 2024-01-08 RX ADMIN — SODIUM CHLORIDE, POTASSIUM CHLORIDE, SODIUM LACTATE AND CALCIUM CHLORIDE: 600; 310; 30; 20 INJECTION, SOLUTION INTRAVENOUS at 12:41

## 2024-01-08 RX ADMIN — ROPIVACAINE HYDROCHLORIDE 50 ML: 5 INJECTION EPIDURAL; INFILTRATION; PERINEURAL at 13:08

## 2024-01-08 RX ADMIN — ONDANSETRON 4 MG: 2 INJECTION INTRAMUSCULAR; INTRAVENOUS at 15:18

## 2024-01-08 RX ADMIN — SUGAMMADEX 150 MG: 100 INJECTION, SOLUTION INTRAVENOUS at 15:29

## 2024-01-08 RX ADMIN — GABAPENTIN 300 MG: 300 CAPSULE ORAL at 12:34

## 2024-01-08 RX ADMIN — METRONIDAZOLE 500 MG: 500 INJECTION, SOLUTION INTRAVENOUS at 13:08

## 2024-01-08 RX ADMIN — PHENAZOPYRIDINE 200 MG: 100 TABLET ORAL at 16:33

## 2024-01-08 RX ADMIN — LIDOCAINE HYDROCHLORIDE 40 MG: 20 INJECTION, SOLUTION EPIDURAL; INFILTRATION; INTRACAUDAL; PERINEURAL at 14:14

## 2024-01-08 RX ADMIN — ROCURONIUM BROMIDE 40 MG: 50 INJECTION, SOLUTION INTRAVENOUS at 14:14

## 2024-01-08 ASSESSMENT — PAIN DESCRIPTION - LOCATION
LOCATION: ABDOMEN

## 2024-01-08 ASSESSMENT — PAIN DESCRIPTION - DESCRIPTORS
DESCRIPTORS: SHARP
DESCRIPTORS: ACHING
DESCRIPTORS: SHARP

## 2024-01-08 ASSESSMENT — PAIN SCALES - GENERAL
PAINLEVEL_OUTOF10: 5
PAINLEVEL_OUTOF10: 7
PAINLEVEL_OUTOF10: 7
PAINLEVEL_OUTOF10: 4

## 2024-01-08 ASSESSMENT — PAIN - FUNCTIONAL ASSESSMENT
PAIN_FUNCTIONAL_ASSESSMENT: ACTIVITIES ARE NOT PREVENTED

## 2024-01-08 ASSESSMENT — PAIN DESCRIPTION - FREQUENCY
FREQUENCY: CONTINUOUS

## 2024-01-08 ASSESSMENT — PAIN DESCRIPTION - ORIENTATION
ORIENTATION: RIGHT

## 2024-01-08 ASSESSMENT — PAIN DESCRIPTION - PAIN TYPE
TYPE: SURGICAL PAIN

## 2024-01-08 ASSESSMENT — PAIN DESCRIPTION - ONSET
ONSET: ON-GOING

## 2024-01-08 ASSESSMENT — LIFESTYLE VARIABLES: SMOKING_STATUS: 0

## 2024-01-08 NOTE — ANESTHESIA PRE PROCEDURE
Department of Anesthesiology  Preprocedure Note       Name:  Tabatha Ray   Age:  43 y.o.  :  1980                                          MRN:  [id number]         Date:  2024      Surgeon: Surgeon(s):  Joaquina Faye DO    Procedure: Procedure(s):  HYSTERECTOMY VAGINAL LAPAROSCOPIC ROBOTIC ASSISTEDPOSS BSO, POSS LAP COLPOPEXY    Medications prior to admission:   Prior to Admission medications    Medication Sig Start Date End Date Taking? Authorizing Provider   norethindrone (AYGESTIN) 5 MG tablet Take 2 tablets by mouth daily  Patient taking differently: Take 1 tablet by mouth in the morning and 1 tablet in the evening. 23   Chas Reynoso APRN - NP   vitamin D (ERGOCALCIFEROL) 1.25 MG (71374 UT) CAPS capsule Take 1 capsule by mouth once a week 23   Chas Reynoso APRN - NP       Current medications:    Current Facility-Administered Medications   Medication Dose Route Frequency Provider Last Rate Last Admin   • lactated ringers IV soln infusion   IntraVENous Continuous Chrystal Martin PA-C 125 mL/hr at 24 1241 New Bag at 24 1241   • sodium chloride flush 0.9 % injection 5-40 mL  5-40 mL IntraVENous 2 times per day Chrystal Martin PA-C       • sodium chloride flush 0.9 % injection 5-40 mL  5-40 mL IntraVENous PRN Chrystal Martin PA-C       • 0.9 % sodium chloride infusion   IntraVENous PRN Chrystal Martin PA-C       • metroNIDAZOLE (FLAGYL) 500 mg in 0.9% NaCl 100 mL IVPB premix  500 mg IntraVENous On Call to OR Chrystal Martin PA-C        And   • levoFLOXacin (LEVAQUIN) 500 MG/100ML infusion 500 mg  500 mg IntraVENous On Call to OR Chrystal Martin PA-C           Allergies:    Allergies   Allergen Reactions   • Penicillins        Problem List:  There is no problem list on file for this patient.      Past Medical History:        Diagnosis Date   • Anemia    • Anxiety    • COVID-19 2022   • Depression 2017   • IBS (irritable bowel

## 2024-01-08 NOTE — ANESTHESIA POSTPROCEDURE EVALUATION
Department of Anesthesiology  Postprocedure Note    Patient: Tabatha Ray  MRN: [id number]  YOB: 1980  Date of evaluation: 1/8/2024    Procedure Summary       Date: 01/08/24 Room / Location: [address]    Anesthesia Start: 1407 Anesthesia Stop: 1541    Procedure: HYSTERECTOMY VAGINAL LAPAROSCOPIC ROBOTIC ASSISTED Diagnosis:       Menorrhagia with regular cycle      (Menorrhagia with regular cycle [N92.0])    Surgeons: Joaquina Faye DO Responsible Provider: Abby Hawthorne APRN - CRNA    Anesthesia Type: general ASA Status: 1            Anesthesia Type: No value filed.    Katie Phase I: Katie Score: 9    Katie Phase II: Katie Score: 10    Anesthesia Post Evaluation    Patient location during evaluation: PACU  Patient participation: complete - patient participated  Level of consciousness: awake and alert  Pain score: 4  Airway patency: patent  Nausea & Vomiting: no nausea and no vomiting  Cardiovascular status: blood pressure returned to baseline  Respiratory status: acceptable and room air  Hydration status: stable  Multimodal analgesia pain management approach  Pain management: adequate and satisfactory to patient    No notable events documented.

## 2024-01-08 NOTE — OP NOTE
Preoperative diagnosis: 1.  Chronic pelvic pain  2.  Menorrhagia  3.  Uterine fibroid  4.  Enlarged uterus    Postoperative diagnosis: Same    Procedure:  Robotic-assisted Total Laparoscopic Hysterectomy with Bilateral Salpingectomy    Surgeon: Dr. Joaquina Coronado    Asst.: Chrystal Sanchez PGY2    Anesthesia: Gen.    Estimated blood loss: 25 mL    Fluids: LR    Urine: 200 mL of clear urine    Condition: Stable    Complications: None    Findings: The patient had an anteverted uterus which sounded to 10 cm in depth.  The tubes absent with the exception of tubal stumps and the ovaries were grossly within normal limits.  Bilateral ureteral peristalsis was noted throughout the case and after closure of the vaginal cuff.    Specimen removed: Uterus and Bilateral tubes    Operative note: The patient was taken to the operating room where general anesthesia was obtained without difficulty.  She was prepped and draped usual sterile fashion in a dorsal lithotomy position.  Timeout was performed.  A weighted speculum was placed in the patient's vagina and the anterior lip of the cervix was grasped with a single-tooth tenaculum.  The cervix was progressively dilated and the uterus was sounded with the findings noted above.  A V care uterine manipulator was then placed.  A Hawkins catheter was placed and left to gravity drainage.  At this point attention was turned to the patient's abdomen where a supraumbilical incision was made with a scalpel.  An 8 mm skin incision was made.  A veress needle was then carefully introduced at a 45° angle while tenting the abdominal wall.  Intraabdominal placement was confirmed by use of water-filled syringe and drop in intra-abdominal pressure with insufflation of CO2.  Pneumoperitoneum was obtained with 2.5 liters of CO2.  An 8 mm robotic port was then placed without difficulty and intra-abdominal placement was confirmed by laparoscopy.  Second and third ports

## 2024-01-08 NOTE — PROGRESS NOTES
Discharge instructions reviewed with pt and Donna friend. All questions answered. Pt verbalizes readiness to go home.

## 2024-01-08 NOTE — ANESTHESIA PROCEDURE NOTES
Peripheral Block    Patient location during procedure: pre-op  Reason for block: post-op pain management and at surgeon's request  Start time: 1/8/2024 1:00 PM  End time: 1/8/2024 1:08 PM  Staffing  Performed: resident/CRNA   Resident/CRNA: Gina Valladares APRN - CRNA  Performed by: Gina Valladares APRN - CRNA  Authorized by: Gina Valladares APRN - CRNA    Preanesthetic Checklist  Completed: patient identified, IV checked, site marked, risks and benefits discussed, surgical/procedural consents, equipment checked, pre-op evaluation, timeout performed, anesthesia consent given, oxygen available, monitors applied/VS acknowledged and blood product R/B/A discussed and consented  Peripheral Block   Patient position: supine  Prep: ChloraPrep  Provider prep: mask and sterile gloves  Patient monitoring: continuous pulse ox, IV access and responsive to questions (cardiac monitor, NIBP, and oxygen readily available)  Block type: Rectus sheath and TAP  Laterality: bilateral  Injection technique: single-shot  Guidance: ultrasound guided  Local infiltration: ropivacaine and decadron  Infiltration strength: 0.5 %  Local infiltration: ropivacaine and decadron  Dose: 50 mLDose: 1 mL    Needle   Needle type: Other   Needle gauge: 22 G  Needle localization: ultrasound guidance  Needle length: 8 cmOther needle type: [place] TAP  Assessment   Injection assessment: negative aspiration for heme, no paresthesia on injection, local visualized surrounding nerve on ultrasound, no intravascular symptoms and low pressure verified by pressure monitor  Paresthesia pain: none  Slow fractionated injection: yes  Hemodynamics: stable  Real-time US image taken/store: yes  Outcomes: uncomplicated and patient tolerated procedure well    Additional Notes  30 mL 0.5% ropivacaine- 30 mL PFNS- 5 mg decadron divided evenly between bilateral TAP blocks  20 mL 0.5% ropivacaine- 10 mL PFNS- 5 mg decadron divided evenly between bilateral Rectus Sheath blocks

## 2024-01-08 NOTE — DISCHARGE INSTRUCTIONS
SAME DAY SURGERY DISCHARGE INSTRUCTIONS    1.  Do not drive or operate hazardous machinery for 24 hours.    2.  Do not make important personal or business decisions for 24 hours.    3.  Do not drink alcoholic beverages for 24 hours.    4.  Do not smoke tobacco products for 24 hours.    5.  Patient should not be left alone for 12-24 hours following surgical procedure.    6.  Eat light foods (Jell-O, soups, etc....) and drink plenty of fluids (water, Sprite, etc...) up to 8 glasses per day, as you can tolerate.    7.  If your bandages become soaked with bright red blood, place another dressing pad over your bandages.  (DO NOT remove original bandage.)  Call your surgeon for further instructions.  A small amount of bright red blood is to be expected.    8.  Wash hands before and after incision care.  It is important to practice good personal hygiene during the post op period.    9.  You may remove your dressing the morning following surgery; leave the steri-strips in place, they will fall off on their own.  If they have not fallen off in 7-10 days, please remove them.      10.  If no drainage from incisions you may shower.    11.  Limit your activities for 24 hours.  Do not engage in heavy work until your surgeon gives you permission.  DO NOT lift anything heavier than 10 pounds.  You may go up & down stairs and do any activity that can be done comfortably.    12.  Report the following signs or any questions regarding your physical condition to your surgeon immediately:    Excessive swelling of, or around the wound area.    Redness or pus-like drainage    Temperature of 100 degrees (F) or above.    Excessive pain.    If unable to urinate 4 hours after surgery.    If bleeding at surgery site continues after 5-10 minutes of pressure.    13.  Pain Control:  Take pain meds as prescribed.  You may use over the counter meds like Acetaminophen or Ibuprofen if not part of the meds already prescribed.    While on narcotic

## 2024-01-09 ENCOUNTER — OFFICE VISIT (OUTPATIENT)
Dept: OBGYN CLINIC | Age: 44
End: 2024-01-09

## 2024-01-09 VITALS — DIASTOLIC BLOOD PRESSURE: 80 MMHG | SYSTOLIC BLOOD PRESSURE: 104 MMHG | WEIGHT: 164 LBS | BODY MASS INDEX: 28.15 KG/M2

## 2024-01-09 DIAGNOSIS — Z09 POSTOPERATIVE EXAMINATION: Primary | ICD-10-CM

## 2024-01-09 PROCEDURE — 99024 POSTOP FOLLOW-UP VISIT: CPT

## 2024-01-09 NOTE — PROGRESS NOTES
Postop Progress Note    Subjective    Tabatha Ray presents to the office for postop follow up.    Objective    Vitals:    01/09/24 1109   BP: 104/80     General: alert, cooperative and no distress  Incision: healing well    Assessment  Patient had TLH yesterday.  Reports that she was instructed to remove dressing in 24 hours and leave steri-strips in place for 7-10 days.  Reports that she removed dressing from left and midline incisions with no trouble and saw steri-strips.  When she tried to remove dressing from right side, noticed that these did not have steri-strips and that there was gauze stuck to dressing.  On exam, when these incisions were closed, there were no steri-strips applied directly to dressing so gauze from pressure dressing was stuck to Mastisol.  Dressings removed in clinic.  Incision appreciated to be well-approximated and hemostatic.  Steri-strips applied to right incisions with care instructions.  No other concerns or complaints today.     Plan  1. Continue any current medications  2. Wound care discussed  3. Pt is to continue with activity restrictions   4. Usual diet  5. Follow up: 1 week - post-op    Electronically signed by Chrystal Martin PA-C on 1/9/2024 at 11:36 AM

## 2024-01-10 LAB — SURGICAL PATHOLOGY REPORT: NORMAL

## 2024-01-12 PROBLEM — D25.9 UTERINE LEIOMYOMA: Status: ACTIVE | Noted: 2024-01-12

## 2024-01-12 PROBLEM — N85.2 HYPERTROPHY OF UTERUS: Status: ACTIVE | Noted: 2024-01-12

## 2024-01-18 ENCOUNTER — OFFICE VISIT (OUTPATIENT)
Dept: OBGYN CLINIC | Age: 44
End: 2024-01-18

## 2024-01-18 ENCOUNTER — HOSPITAL ENCOUNTER (OUTPATIENT)
Age: 44
Setting detail: SPECIMEN
Discharge: HOME | End: 2024-01-18

## 2024-01-18 VITALS — SYSTOLIC BLOOD PRESSURE: 94 MMHG | BODY MASS INDEX: 27.98 KG/M2 | WEIGHT: 163 LBS | DIASTOLIC BLOOD PRESSURE: 70 MMHG

## 2024-01-18 DIAGNOSIS — N89.8 VAGINAL DISCHARGE: ICD-10-CM

## 2024-01-18 DIAGNOSIS — R10.2 VAGINAL PAIN: Primary | ICD-10-CM

## 2024-01-18 DIAGNOSIS — Z09 POSTOPERATIVE EXAMINATION: ICD-10-CM

## 2024-01-18 DIAGNOSIS — R10.2 VAGINAL PAIN: ICD-10-CM

## 2024-01-18 LAB
CANDIDA SPECIES: NEGATIVE
GARDNERELLA VAGINALIS: POSITIVE
SOURCE: ABNORMAL
TRICHOMONAS: NEGATIVE

## 2024-01-18 PROCEDURE — 99024 POSTOP FOLLOW-UP VISIT: CPT

## 2024-01-18 NOTE — PROGRESS NOTES
Postop Progress Note    Subjective    Tabatha Ray presents to the office for postop follow up.  Chief Complaint   Patient presents with    Post-Op Check     Patient had TLH on 1-8-2024.  Patient reports feeling a little sore internally/vaginally which just started last night. Patient feels she was likely doing to much. Had to take her daughter to [place] and was in the hospital all day with her.  Has also spent a lot of time on her feet.  Denies bleeding. She had a little bit of pink discharge this morning.     Objective    Vitals:    01/18/24 1110   BP: 94/70     General: alert, cooperative and no distress  Incision: healing well    Assessment  Doing well postoperatively.  She reports that she was doing a lot yesterday with taking daughter to appointment in [place] and has been on feet a lot - started having vaginal soreness last night with some very light bleeding.  Agreeable to vaginal cx today - yellow discharge noted on exam but suture line well-approximated with no bleeding noted.  Discussed importance of resting.  Patient does report that she will have all of her kids this weekend - stressed that she will need to adhere to restrictions and ensure she is resting between activity.  Abdominal incisions healing appropriately.  Patient intends to return to work Feb 4 - will need note for restrictions if indicated at that time.     Plan  1. Continue any current medications  2. Wound care discussed  3. Pt is to continue with activity restrictions   4. Usual diet  5. Follow up: 4 weeks     Electronically signed by Chrystal Martin PA-C on 1/18/2024 at 3:27 PM

## 2024-01-19 RX ORDER — METRONIDAZOLE 500 MG/1
500 TABLET ORAL 2 TIMES DAILY
Qty: 14 TABLET | Refills: 0 | Status: SHIPPED | OUTPATIENT
Start: 2024-01-19 | End: 2024-01-26

## 2024-01-31 ENCOUNTER — TELEPHONE (OUTPATIENT)
Dept: OBGYN CLINIC | Age: 44
End: 2024-01-31

## 2024-01-31 NOTE — TELEPHONE ENCOUNTER
Pt called stating she just finished her Flagyl and is still having some thin pink discharge and some vaginal discomfort internally. Pt is wondering what she should do. Please advise.

## 2024-02-01 ENCOUNTER — HOSPITAL ENCOUNTER (OUTPATIENT)
Age: 44
Setting detail: SPECIMEN
Discharge: HOME | End: 2024-02-01

## 2024-02-01 ENCOUNTER — OFFICE VISIT (OUTPATIENT)
Dept: OBGYN CLINIC | Age: 44
End: 2024-02-01

## 2024-02-01 VITALS — SYSTOLIC BLOOD PRESSURE: 98 MMHG | BODY MASS INDEX: 26.95 KG/M2 | WEIGHT: 157 LBS | DIASTOLIC BLOOD PRESSURE: 70 MMHG

## 2024-02-01 DIAGNOSIS — N89.8 VAGINAL DISCHARGE: Primary | ICD-10-CM

## 2024-02-01 DIAGNOSIS — N89.8 VAGINAL DISCHARGE: ICD-10-CM

## 2024-02-01 DIAGNOSIS — Z09 POSTOPERATIVE EXAMINATION: ICD-10-CM

## 2024-02-01 PROCEDURE — 99024 POSTOP FOLLOW-UP VISIT: CPT

## 2024-02-01 NOTE — PROGRESS NOTES
Postop Progress Note    Subjective    Tabatha Ray presents to the office for postop follow up.  Chief Complaint   Patient presents with    Post-Op Check     Patient had TLH on 1-8. She was diagnosed with BV  on Jan. 18th.  She reports still having some pink discharge when wiping.      Objective    Vitals:    02/01/24 1308   BP: 98/70     General: alert, cooperative and no distress  Incision: healing well    Assessment  Patient presents with ongoing vaginal discharge s/p TLH.  She had positive vaginal cx for BV on 01/18.  She reports that she finished Flagyl about a week ago and never really noticed full improvement of symptoms.  She has continued to have pink-colored discharge with wiping and now notices vaginal irritation.  Pelvic pain has improved.  She is agreeable to repeat vaginal cx today.  Reports that she has tried to reduce activity but is still up and moving around a lot.  Discussed that it can take longer to heal if infection persists and is restrictions not followed.  At this time there is yellow-brown discharge present on exam but cuff is intact.     Plan  1. Continue any current medications  2. Wound care discussed  3. Pt is to continue with activity restrictions   4. Usual diet  5. Follow up: 1 week    Electronically signed by Chrystal Martin PA-C on 2/1/2024 at 1:17 PM

## 2024-02-02 LAB
CANDIDA SPECIES: NEGATIVE
GARDNERELLA VAGINALIS: POSITIVE
SOURCE: ABNORMAL
TRICHOMONAS: NEGATIVE

## 2024-02-05 RX ORDER — METRONIDAZOLE 500 MG/1
500 TABLET ORAL 2 TIMES DAILY
Qty: 14 TABLET | Refills: 0 | Status: SHIPPED | OUTPATIENT
Start: 2024-02-05 | End: 2024-02-12

## 2024-02-07 ENCOUNTER — OFFICE VISIT (OUTPATIENT)
Dept: OBGYN CLINIC | Age: 44
End: 2024-02-07

## 2024-02-07 VITALS — WEIGHT: 154 LBS | SYSTOLIC BLOOD PRESSURE: 104 MMHG | BODY MASS INDEX: 26.43 KG/M2 | DIASTOLIC BLOOD PRESSURE: 62 MMHG

## 2024-02-07 DIAGNOSIS — B96.89 BACTERIAL VAGINOSIS: Primary | ICD-10-CM

## 2024-02-07 DIAGNOSIS — Z09 POSTOPERATIVE EXAMINATION: ICD-10-CM

## 2024-02-07 DIAGNOSIS — N76.0 BACTERIAL VAGINOSIS: Primary | ICD-10-CM

## 2024-02-07 PROCEDURE — 99024 POSTOP FOLLOW-UP VISIT: CPT | Performed by: OBSTETRICS & GYNECOLOGY

## 2024-02-07 NOTE — PROGRESS NOTES
Postop Progress Note    Subjective    Tabatha Ray presents to the office for postop follow up.    Chief Complaint   Patient presents with    Post-Op Check     Patient had TLH, chayo. Salpingectgomy on 1-8-2024. Patient just started flagyl yesterday for BV. This is her second round of flagyl since surgery.          Objective    Vitals:    02/07/24 1435   BP: 104/62     General: alert, cooperative and no distress  Incision: healing well but still having pinkish brown discharge on flagyl    Assessment  Doing well postoperatively.    Plan  1. Continue any current medications  2. Wound care discussed  3. Pt is to increase activities as tolerated after emely  4. Usual diet  5. Follow up: 2 weeks to recheck for bv    Electronically signed by Joaquina Coronado DO on 2/7/2024 at 2:45 PM

## 2024-02-19 ENCOUNTER — OFFICE VISIT (OUTPATIENT)
Dept: OBGYN CLINIC | Age: 44
End: 2024-02-19
Payer: COMMERCIAL

## 2024-02-19 VITALS — WEIGHT: 159.4 LBS | SYSTOLIC BLOOD PRESSURE: 108 MMHG | DIASTOLIC BLOOD PRESSURE: 64 MMHG | BODY MASS INDEX: 27.36 KG/M2

## 2024-02-19 DIAGNOSIS — B96.89 BACTERIAL VAGINOSIS: ICD-10-CM

## 2024-02-19 DIAGNOSIS — N76.0 BACTERIAL VAGINOSIS: ICD-10-CM

## 2024-02-19 DIAGNOSIS — N93.9 VAGINAL SPOTTING: Primary | ICD-10-CM

## 2024-02-19 PROCEDURE — 99213 OFFICE O/P EST LOW 20 MIN: CPT | Performed by: NURSE PRACTITIONER

## 2024-02-19 ASSESSMENT — ENCOUNTER SYMPTOMS: RESPIRATORY NEGATIVE: 1

## 2024-02-19 NOTE — PROGRESS NOTES
PROBLEM VISIT     Date of service: 2024    Tabatha Ray  Is a 44 y.o. female    PT's PCP is: No primary care provider on file.     : 1980                                             Subjective:       No LMP recorded (lmp unknown). Patient has had a hysterectomy.   OB History    Para Term  AB Living   4 4 4     4   SAB IAB Ectopic Molar Multiple Live Births             4      # Outcome Date GA Lbr Jose Rafael/2nd Weight Sex Delivery Anes PTL Lv   4 Term      Vag-Spont   LIZET   3 Term      Vag-Spont   LIZET   2 Term      Vag-Spont   LIZET   1 Term      Vag-Spont   LIZET        Social History     Tobacco Use   Smoking Status Never   Smokeless Tobacco Never        Social History     Substance and Sexual Activity   Alcohol Use Not Currently    Comment: occ       Allergies: Penicillins    No current outpatient medications on file.    Social History     Substance and Sexual Activity   Sexual Activity Yes    Partners: Male         Chief Complaint   Patient presents with    Follow-up     MAURO BV. Reports still having some discharge but denies any further discomfort. Had spotting last week but walked 2 miles.         PE:  Vital Signs  Blood pressure 108/64, weight 72.3 kg (159 lb 6.4 oz).     HPI: Patient presents today for MAURO BV. Has been treated x2 with oral metronidazole course. Denies any itching or irritation. Continues to experience pink tinged discharge. Admits to bright red spotting following 2 mile walk. Denies any pain.     PT denies fever, chills, nausea and vomiting       Review of Systems   Constitutional: Negative.    Respiratory: Negative.     Cardiovascular: Negative.    Genitourinary:  Positive for vaginal bleeding and vaginal discharge. Negative for difficulty urinating, dysuria, frequency, pelvic pain and vaginal pain.       Physical Exam  Constitutional:       Appearance: Normal appearance.   HENT:      Head: Normocephalic.   Pulmonary:      Effort: Pulmonary effort is normal.

## 2024-02-27 ENCOUNTER — TELEPHONE (OUTPATIENT)
Dept: OBGYN CLINIC | Age: 44
End: 2024-02-27

## 2024-02-27 RX ORDER — LEVOFLOXACIN 500 MG/1
500 TABLET, FILM COATED ORAL DAILY
Qty: 7 TABLET | Refills: 0 | Status: SHIPPED | OUTPATIENT
Start: 2024-02-27 | End: 2024-03-05

## 2024-02-27 RX ORDER — FLUCONAZOLE 150 MG/1
150 TABLET ORAL ONCE
Qty: 1 TABLET | Refills: 1 | Status: SHIPPED | OUTPATIENT
Start: 2024-02-27 | End: 2024-02-27

## 2024-02-27 RX ORDER — CIPROFLOXACIN 250 MG/1
250 TABLET, FILM COATED ORAL 2 TIMES DAILY
Qty: 6 TABLET | Refills: 0 | Status: SHIPPED | OUTPATIENT
Start: 2024-02-27 | End: 2024-03-01

## 2024-02-27 RX ORDER — METRONIDAZOLE 7.5 MG/G
1 GEL VAGINAL DAILY
Qty: 1 EACH | Refills: 0 | Status: SHIPPED | OUTPATIENT
Start: 2024-02-27 | End: 2024-03-03

## 2024-02-27 NOTE — TELEPHONE ENCOUNTER
Voicemail received from Thi at [facility] wanting to clarify prescriptions received this morning.  I called Thi back and confirmed with her that patient does need the cipro and the levaquin and has been instructed already to take the cipro first then the levaquin.

## 2024-02-27 NOTE — TELEPHONE ENCOUNTER
Patient called and was given all results and recommendations. She will keep follow-up appointment next week and call back if any questions.

## 2024-02-27 NOTE — TELEPHONE ENCOUNTER
Please call patient with [facility] results.     She was positive for multiple bacteria will need to treat in phases.     Positive BV- will Rx Metrogel, she will use this 5 nights at bedtime.   Positive yeast- take Diflucan once orally. Will send a refill for her to take when she is done with all other treatment.   E. Faecalis- will send Rx for Cipro BID x3 days. I would like her to start with this. When completed I would like her to start last script for Ureaplasma- she will take levofloxacin BID x7 days.     Vaginal bernadette is displaced- would like her to start women's probiotic- [employer] of [employer]

## 2024-03-04 ENCOUNTER — OFFICE VISIT (OUTPATIENT)
Dept: OBGYN CLINIC | Age: 44
End: 2024-03-04
Payer: COMMERCIAL

## 2024-03-04 VITALS — SYSTOLIC BLOOD PRESSURE: 108 MMHG | BODY MASS INDEX: 26.26 KG/M2 | WEIGHT: 153 LBS | DIASTOLIC BLOOD PRESSURE: 60 MMHG

## 2024-03-04 DIAGNOSIS — T81.32XD DISRUPTION OF INTERNAL SURGICAL WOUND, SUBSEQUENT ENCOUNTER: ICD-10-CM

## 2024-03-04 DIAGNOSIS — N93.9 VAGINAL SPOTTING: Primary | ICD-10-CM

## 2024-03-04 PROCEDURE — 99214 OFFICE O/P EST MOD 30 MIN: CPT | Performed by: NURSE PRACTITIONER

## 2024-03-04 NOTE — PROGRESS NOTES
PROBLEM VISIT     Date of service: 3/4/2024    Tabatha Ray  Is a 44 y.o. female    PT's PCP is: No primary care provider on file.     : 1980                                             Subjective:       No LMP recorded (lmp unknown). Patient has had a hysterectomy.   OB History    Para Term  AB Living   4 4 4     4   SAB IAB Ectopic Molar Multiple Live Births             4      # Outcome Date GA Lbr Jose Rafael/2nd Weight Sex Delivery Anes PTL Lv   4 Term      Vag-Spont   LIZET   3 Term      Vag-Spont   LIZET   2 Term      Vag-Spont   LIZET   1 Term      Vag-Spont   LIZET        Social History     Tobacco Use   Smoking Status Never   Smokeless Tobacco Never        Social History     Substance and Sexual Activity   Alcohol Use Not Currently    Comment: occ       Allergies: Penicillins      Current Outpatient Medications:     estradiol (ESTRACE VAGINAL) 0.1 MG/GM vaginal cream, Place 1 g vaginally Twice a Week, Disp: 1 each, Rfl: 0    Social History     Substance and Sexual Activity   Sexual Activity Yes    Partners: Male         Chief Complaint   Patient presents with    Follow-up     Recheck vaginal cuff. Reports \"barely any spotting\" anymore.         PE:  Vital Signs  Blood pressure 108/60, weight 69.4 kg (153 lb).     HPI: Patient presents today for recheck of vaginal cuff disruption. States spotting minimally and not daily at this point. Continues taking antibiotic course for vaginal infections. Has one dose of Metrogel left. Denies any activity outside of restrictions. Denies SI.     PT denies fever, chills, nausea and vomiting       Review of Systems   Constitutional: Negative.    Respiratory: Negative.     Cardiovascular: Negative.    Gastrointestinal: Negative.    Genitourinary:  Positive for vaginal bleeding (intermittent spotting). Negative for difficulty urinating, dysuria, frequency, pelvic pain, vaginal discharge and vaginal pain.       Physical Exam  Constitutional:       Appearance:

## 2024-03-05 RX ORDER — ESTRADIOL 0.1 MG/G
1 CREAM VAGINAL
Qty: 1 EACH | Refills: 0 | Status: SHIPPED | OUTPATIENT
Start: 2024-03-07

## 2024-03-25 ENCOUNTER — OFFICE VISIT (OUTPATIENT)
Dept: OBGYN CLINIC | Age: 44
End: 2024-03-25
Payer: COMMERCIAL

## 2024-03-25 VITALS
BODY MASS INDEX: 26.56 KG/M2 | DIASTOLIC BLOOD PRESSURE: 62 MMHG | HEIGHT: 64 IN | SYSTOLIC BLOOD PRESSURE: 112 MMHG | WEIGHT: 155.6 LBS

## 2024-03-25 DIAGNOSIS — T81.32XD DISRUPTION OF INTERNAL SURGICAL WOUND, SUBSEQUENT ENCOUNTER: Primary | ICD-10-CM

## 2024-03-25 PROCEDURE — 99213 OFFICE O/P EST LOW 20 MIN: CPT | Performed by: NURSE PRACTITIONER

## 2024-04-01 ASSESSMENT — ENCOUNTER SYMPTOMS
CONSTIPATION: 0
VOMITING: 0
DIARRHEA: 0
NAUSEA: 0
RESPIRATORY NEGATIVE: 1

## 2024-04-01 NOTE — PROGRESS NOTES
PROBLEM VISIT     Date of service: 3/25/2024    Tabatha Ray  Is a 44 y.o. female    PT's PCP is: No primary care provider on file.     : 1980                                             Subjective:       No LMP recorded (lmp unknown). Patient has had a hysterectomy.   OB History    Para Term  AB Living   4 4 4     4   SAB IAB Ectopic Molar Multiple Live Births             4      # Outcome Date GA Lbr Jose Rafael/2nd Weight Sex Delivery Anes PTL Lv   4 Term      Vag-Spont   LIZET   3 Term      Vag-Spont   LIZET   2 Term      Vag-Spont   LIZET   1 Term      Vag-Spont   LIZET        Social History     Tobacco Use   Smoking Status Never   Smokeless Tobacco Never        Social History     Substance and Sexual Activity   Alcohol Use Not Currently    Comment: occ       Allergies: Penicillins      Current Outpatient Medications:     estradiol (ESTRACE VAGINAL) 0.1 MG/GM vaginal cream, Place 1 g vaginally Twice a Week, Disp: 1 each, Rfl: 0    Social History     Substance and Sexual Activity   Sexual Activity Yes    Partners: Male         Chief Complaint   Patient presents with    Follow-up     Recheck vaginal cuff. Reports feeling crampy on right side but denies any further bleeding.         PE:  Vital Signs  Blood pressure 112/62, height 1.626 m (5' 4\"), weight 70.6 kg (155 lb 9.6 oz).     HPI: Patient presents today for recheck of vaginal cuff. Reports intermittent cramping in RLQ. Denies any vaginal bleeding, discharge or irritation.     PT denies fever, chills, nausea and vomiting       Review of Systems   Constitutional: Negative.    Respiratory: Negative.     Cardiovascular: Negative.    Gastrointestinal:  Negative for constipation, diarrhea, nausea and vomiting.   Genitourinary:  Positive for pelvic pain (RLQ cramping). Negative for dysuria, frequency, vaginal bleeding, vaginal discharge and vaginal pain.       Physical Exam  Constitutional:       Appearance: Normal appearance.   HENT:      Head:

## 2024-04-09 ENCOUNTER — OFFICE VISIT (OUTPATIENT)
Dept: OBGYN CLINIC | Age: 44
End: 2024-04-09
Payer: COMMERCIAL

## 2024-04-09 VITALS — WEIGHT: 152 LBS | DIASTOLIC BLOOD PRESSURE: 62 MMHG | SYSTOLIC BLOOD PRESSURE: 114 MMHG | BODY MASS INDEX: 26.09 KG/M2

## 2024-04-09 DIAGNOSIS — T81.32XD DISRUPTION OF INTERNAL SURGICAL WOUND, SUBSEQUENT ENCOUNTER: Primary | ICD-10-CM

## 2024-04-09 PROCEDURE — 99213 OFFICE O/P EST LOW 20 MIN: CPT | Performed by: NURSE PRACTITIONER

## 2024-04-09 ASSESSMENT — ENCOUNTER SYMPTOMS: RESPIRATORY NEGATIVE: 1

## 2024-04-09 NOTE — PROGRESS NOTES
Head: Normocephalic.   Pulmonary:      Effort: Pulmonary effort is normal.   Genitourinary:     General: Normal vulva.      Vagina: No vaginal discharge, erythema or bleeding.      Comments: Pea sized area of erythema at center of vaginal cuff.   Musculoskeletal:         General: Normal range of motion.   Neurological:      General: No focal deficit present.      Mental Status: She is alert.   Psychiatric:         Mood and Affect: Mood normal.         Behavior: Behavior normal.     Chaperone: not present    Assessment and Plan          Diagnosis Orders   1. Disruption of internal surgical wound, subsequent encounter            continue with vaginal estrogen.   Restrictions of activity and sexual activity continue       I am having Tabatha Ray maintain her estradiol.    Return in about 3 weeks (around 4/30/2024).    There are no Patient Instructions on file for this visit.    Time spent 20 minutes      LORENZO Jiang NP,4/9/2024 3:45 PM

## 2024-04-25 ENCOUNTER — OFFICE VISIT (OUTPATIENT)
Dept: OBGYN CLINIC | Age: 44
End: 2024-04-25
Payer: COMMERCIAL

## 2024-04-25 VITALS
DIASTOLIC BLOOD PRESSURE: 72 MMHG | HEIGHT: 64 IN | BODY MASS INDEX: 26.67 KG/M2 | SYSTOLIC BLOOD PRESSURE: 112 MMHG | WEIGHT: 156.2 LBS

## 2024-04-25 DIAGNOSIS — T81.32XD DISRUPTION OF INTERNAL SURGICAL WOUND, SUBSEQUENT ENCOUNTER: Primary | ICD-10-CM

## 2024-04-25 PROCEDURE — 99213 OFFICE O/P EST LOW 20 MIN: CPT | Performed by: NURSE PRACTITIONER

## 2024-04-25 NOTE — PROGRESS NOTES
PROBLEM VISIT     Date of service: 2024    Tabatha Ray  Is a 44 y.o. female    PT's PCP is: No primary care provider on file.     : 1980                                             Subjective:       No LMP recorded (lmp unknown). Patient has had a hysterectomy.   OB History    Para Term  AB Living   4 4 4     4   SAB IAB Ectopic Molar Multiple Live Births             4      # Outcome Date GA Lbr Jose Rafael/2nd Weight Sex Delivery Anes PTL Lv   4 Term      Vag-Spont   LIZET   3 Term      Vag-Spont   LIZET   2 Term      Vag-Spont   LIZET   1 Term      Vag-Spont   LIZET        Social History     Tobacco Use   Smoking Status Never   Smokeless Tobacco Never        Social History     Substance and Sexual Activity   Alcohol Use Not Currently    Comment: occ       Allergies: Penicillins      Current Outpatient Medications:     estradiol (ESTRACE VAGINAL) 0.1 MG/GM vaginal cream, Place 1 g vaginally Twice a Week, Disp: 1 each, Rfl: 0    Social History     Substance and Sexual Activity   Sexual Activity Yes    Partners: Male         Chief Complaint   Patient presents with    Follow-up     Recheck vaginal cuff. No further bleeding.        PE:  Vital Signs  Blood pressure 112/72, height 1.626 m (5' 4\"), weight 70.9 kg (156 lb 3.2 oz).     HPI: Patient presents today for recheck of vaginal cuff. Denies any vaginal bleeding or discharge.     PT denies fever, chills, nausea and vomiting       Review of Systems    Physical Exam  Constitutional:       General: She is not in acute distress.     Appearance: Normal appearance. She is not ill-appearing.   HENT:      Head: Normocephalic.   Pulmonary:      Effort: Pulmonary effort is normal.   Genitourinary:     General: Normal vulva.      Vagina: No vaginal discharge.      Comments: Vaginal cuff well healed.  Musculoskeletal:         General: Normal range of motion.   Neurological:      General: No focal deficit present.      Mental Status: She is alert.

## 2025-02-19 SDOH — ECONOMIC STABILITY: INCOME INSECURITY: IN THE LAST 12 MONTHS, WAS THERE A TIME WHEN YOU WERE NOT ABLE TO PAY THE MORTGAGE OR RENT ON TIME?: YES

## 2025-02-19 SDOH — ECONOMIC STABILITY: FOOD INSECURITY: WITHIN THE PAST 12 MONTHS, YOU WORRIED THAT YOUR FOOD WOULD RUN OUT BEFORE YOU GOT MONEY TO BUY MORE.: NEVER TRUE

## 2025-02-19 SDOH — ECONOMIC STABILITY: FOOD INSECURITY: WITHIN THE PAST 12 MONTHS, THE FOOD YOU BOUGHT JUST DIDN'T LAST AND YOU DIDN'T HAVE MONEY TO GET MORE.: NEVER TRUE

## 2025-02-19 SDOH — ECONOMIC STABILITY: TRANSPORTATION INSECURITY
IN THE PAST 12 MONTHS, HAS THE LACK OF TRANSPORTATION KEPT YOU FROM MEDICAL APPOINTMENTS OR FROM GETTING MEDICATIONS?: NO

## 2025-02-19 ASSESSMENT — PATIENT HEALTH QUESTIONNAIRE - PHQ9
1. LITTLE INTEREST OR PLEASURE IN DOING THINGS: NOT AT ALL
SUM OF ALL RESPONSES TO PHQ QUESTIONS 1-9: 0
SUM OF ALL RESPONSES TO PHQ9 QUESTIONS 1 & 2: 0
2. FEELING DOWN, DEPRESSED OR HOPELESS: NOT AT ALL
SUM OF ALL RESPONSES TO PHQ9 QUESTIONS 1 & 2: 0
2. FEELING DOWN, DEPRESSED OR HOPELESS: NOT AT ALL
1. LITTLE INTEREST OR PLEASURE IN DOING THINGS: NOT AT ALL
SUM OF ALL RESPONSES TO PHQ QUESTIONS 1-9: 0

## 2025-02-20 ENCOUNTER — HOSPITAL ENCOUNTER (OUTPATIENT)
Age: 45
Setting detail: SPECIMEN
Discharge: HOME | End: 2025-02-20

## 2025-02-20 ENCOUNTER — OFFICE VISIT (OUTPATIENT)
Dept: OBGYN CLINIC | Age: 45
End: 2025-02-20
Payer: COMMERCIAL

## 2025-02-20 VITALS
HEIGHT: 64 IN | BODY MASS INDEX: 26.7 KG/M2 | WEIGHT: 156.4 LBS | SYSTOLIC BLOOD PRESSURE: 112 MMHG | DIASTOLIC BLOOD PRESSURE: 70 MMHG

## 2025-02-20 DIAGNOSIS — R82.90 ABNORMAL URINE ODOR: ICD-10-CM

## 2025-02-20 DIAGNOSIS — R30.0 DYSURIA: ICD-10-CM

## 2025-02-20 DIAGNOSIS — N94.10 DYSPAREUNIA IN FEMALE: ICD-10-CM

## 2025-02-20 DIAGNOSIS — R30.0 DYSURIA: Primary | ICD-10-CM

## 2025-02-20 DIAGNOSIS — R10.2 PELVIC CRAMPING: ICD-10-CM

## 2025-02-20 LAB
BILIRUBIN, POC: ABNORMAL
BLOOD URINE, POC: ABNORMAL
CLARITY, POC: CLEAR
COLOR, POC: ABNORMAL
GLUCOSE URINE, POC: ABNORMAL MG/DL
KETONES, POC: ABNORMAL MG/DL
LEUKOCYTE EST, POC: ABNORMAL
NITRITE, POC: ABNORMAL
PH, POC: 6.5
PROTEIN, POC: ABNORMAL MG/DL
SPECIFIC GRAVITY, POC: 1.01
UROBILINOGEN, POC: ABNORMAL MG/DL

## 2025-02-20 PROCEDURE — 99459 PELVIC EXAMINATION: CPT

## 2025-02-20 PROCEDURE — 99213 OFFICE O/P EST LOW 20 MIN: CPT

## 2025-02-20 PROCEDURE — 81002 URINALYSIS NONAUTO W/O SCOPE: CPT

## 2025-02-20 ASSESSMENT — ENCOUNTER SYMPTOMS
ABDOMINAL PAIN: 0
NAUSEA: 0
VOMITING: 0

## 2025-02-20 NOTE — PROGRESS NOTES
DATE OF VISIT:  2/20/25    PATIENT NAME:  Tabatha Ray     YOB: 1980    REASON FOR VISIT:    Chief Complaint   Patient presents with    Dysuria     C/O dysuria, cloudy odorous urine and cramping x 1 wk. Reports dyspareunia during this time as well.       Chaperone for Intimate Exam  Chaperone was offered as part of the rooming process. Patient declined and agrees to continue with exam without a chaperone.      HISTORY OF PRESENT ILLNESS:  Patient presents with dysuria, cloudy urine, pelvic cramping, dyspareunia x 1 week.  Denies discharge, hematuria, flank pain, frequency/urgency.  UA in clinic WNL except trace blood.  She does report that she started pushing water yesterday, was wondering if she was dehydrated.  Agreeable to urine cx and vaginal cx.          No LMP recorded (lmp unknown). Patient has had a hysterectomy.  Vitals:    02/20/25 0822   BP: 112/70   Weight: 70.9 kg (156 lb 6.4 oz)   Height: 1.626 m (5' 4\")     Body mass index is 26.85 kg/m².  Allergies   Allergen Reactions    Penicillins      No current outpatient medications on file.     No current facility-administered medications for this visit.     Social History     Socioeconomic History    Marital status:      Spouse name: None    Number of children: None    Years of education: None    Highest education level: None   Tobacco Use    Smoking status: Never    Smokeless tobacco: Never   Vaping Use    Vaping status: Never Used   Substance and Sexual Activity    Alcohol use: Not Currently     Comment: occ    Drug use: Never    Sexual activity: Yes     Partners: Male     Social Determinants of Health     Housing Stability: Unknown (2/19/2025)    Housing Stability Vital Sign     Number of Times Moved in the Last Year: 0     Homeless in the Last Year: No       REVIEW OF SYSTEMS:  Review of Systems   Constitutional:  Negative for chills, fatigue and fever.   Gastrointestinal:  Negative for abdominal pain, nausea and vomiting.  Calm

## 2025-02-21 LAB
CANDIDA SPECIES: POSITIVE
GARDNERELLA VAGINALIS: POSITIVE
MICROORGANISM SPEC CULT: NORMAL
SERVICE CMNT-IMP: NORMAL
SOURCE: ABNORMAL
SPECIMEN DESCRIPTION: NORMAL
TRICHOMONAS: NEGATIVE

## 2025-02-21 RX ORDER — FLUCONAZOLE 150 MG/1
150 TABLET ORAL ONCE
Qty: 1 TABLET | Refills: 0 | Status: SHIPPED | OUTPATIENT
Start: 2025-02-21 | End: 2025-02-21

## 2025-02-21 RX ORDER — METRONIDAZOLE 500 MG/1
500 TABLET ORAL 2 TIMES DAILY
Qty: 14 TABLET | Refills: 0 | Status: SHIPPED | OUTPATIENT
Start: 2025-02-21 | End: 2025-02-28

## 2025-09-03 ENCOUNTER — OFFICE VISIT (OUTPATIENT)
Dept: OBGYN CLINIC | Age: 45
End: 2025-09-03
Payer: COMMERCIAL

## 2025-09-03 VITALS
SYSTOLIC BLOOD PRESSURE: 108 MMHG | WEIGHT: 147.6 LBS | HEIGHT: 64 IN | BODY MASS INDEX: 25.2 KG/M2 | DIASTOLIC BLOOD PRESSURE: 70 MMHG

## 2025-09-03 DIAGNOSIS — N63.24 UNSPECIFIED LUMP IN THE LEFT BREAST, LOWER INNER QUADRANT: Primary | ICD-10-CM

## 2025-09-03 PROCEDURE — 99213 OFFICE O/P EST LOW 20 MIN: CPT | Performed by: NURSE PRACTITIONER

## 2025-09-04 ASSESSMENT — ENCOUNTER SYMPTOMS
RESPIRATORY NEGATIVE: 1
GASTROINTESTINAL NEGATIVE: 1

## (undated) DEVICE — ELECTRO LUBE IS A SINGLE PATIENT USE DEVICE THAT IS INTENDED TO BE USED ON ELECTROSURGICAL ELECTRODES TO REDUCE STICKING.: Brand: KEY SURGICAL ELECTRO LUBE

## (undated) DEVICE — Device

## (undated) DEVICE — SUTURE DEV SZ 2-0 WND CLSR ABSRB GS-22 VLOC COVIDIEN VLOCM2145

## (undated) DEVICE — SPONGE GZ W2XL2IN NONWOVEN 4 PLY FASTER WICKING ABIL AVANT

## (undated) DEVICE — GLOVE SURG SZ 65 CRM LTX FREE POLYISOPRENE POLYMER BEAD ANTI

## (undated) DEVICE — PEN SURG MINI SKIN MRK FN TIP W/ RUL W/OUT LBL TRAD PREP

## (undated) DEVICE — DRESSING HEMSTAT W1X2IN ABSRB SURGCEL SNOW

## (undated) DEVICE — MTHZ GYN LAPAROSCOPY: Brand: MEDLINE INDUSTRIES, INC.

## (undated) DEVICE — VCARE MEDIUM, UTERINE MANIPULATOR, VAGINAL-CERVICAL-AHLUWALIA'S-RETRACTOR-ELEVATOR: Brand: VCARE

## (undated) DEVICE — ARM DRAPE

## (undated) DEVICE — TIP COVER ACCESSORY

## (undated) DEVICE — GOWN,SIRUS,POLYRNF,BRTHSLV,LG,30/CS: Brand: MEDLINE

## (undated) DEVICE — [HIGH FLOW INSUFFLATOR,  DO NOT USE IF PACKAGE IS DAMAGED,  KEEP DRY,  KEEP AWAY FROM SUNLIGHT,  PROTECT FROM HEAT AND RADIOACTIVE SOURCES.]: Brand: PNEUMOSURE

## (undated) DEVICE — COVER LT HNDL BLU PLAS

## (undated) DEVICE — SUTURE MCRYL SZ 4-0 L27IN ABSRB UD L19MM PS-2 1/2 CIR PRIM Y426H

## (undated) DEVICE — SUTURE V-LOC 180 SZ 2-0 L9IN ABSRB GRN L27MM GS-22 1/2 CIR VLOCL2145

## (undated) DEVICE — C-ARM: Brand: UNBRANDED

## (undated) DEVICE — INSUFFLATION NEEDLE TO ESTABLISH PNEUMOPERITONEUM.: Brand: INSUFFLATION NEEDLE

## (undated) DEVICE — SOLUTION IV 1000ML 0.9% SOD CHL FOR IRRIG PLAS CONT

## (undated) DEVICE — ENDOSCOPIC KIT CLN SWAB MICROFIBER CLTH SCP CLEANOR DISP

## (undated) DEVICE — SOLUTION IRRIG 1000ML 0.9% SOD CHL USP POUR PLAS BTL

## (undated) DEVICE — BLADELESS OBTURATOR: Brand: WECK VISTA

## (undated) DEVICE — CANNULA SEAL